# Patient Record
Sex: FEMALE | Race: WHITE
[De-identification: names, ages, dates, MRNs, and addresses within clinical notes are randomized per-mention and may not be internally consistent; named-entity substitution may affect disease eponyms.]

---

## 2020-12-14 ENCOUNTER — HOSPITAL ENCOUNTER (OUTPATIENT)
Dept: HOSPITAL 47 - EC | Age: 60
Setting detail: OBSERVATION
Discharge: HOME | End: 2020-12-14
Attending: HOSPITALIST | Admitting: HOSPITALIST
Payer: COMMERCIAL

## 2020-12-14 VITALS — HEART RATE: 66 BPM | RESPIRATION RATE: 16 BRPM

## 2020-12-14 VITALS — SYSTOLIC BLOOD PRESSURE: 147 MMHG | DIASTOLIC BLOOD PRESSURE: 75 MMHG

## 2020-12-14 VITALS — TEMPERATURE: 97.8 F

## 2020-12-14 DIAGNOSIS — Z88.1: ICD-10-CM

## 2020-12-14 DIAGNOSIS — F32.9: ICD-10-CM

## 2020-12-14 DIAGNOSIS — Z79.899: ICD-10-CM

## 2020-12-14 DIAGNOSIS — F41.9: ICD-10-CM

## 2020-12-14 DIAGNOSIS — R07.89: Primary | ICD-10-CM

## 2020-12-14 DIAGNOSIS — R63.0: ICD-10-CM

## 2020-12-14 DIAGNOSIS — I10: ICD-10-CM

## 2020-12-14 DIAGNOSIS — I16.0: ICD-10-CM

## 2020-12-14 DIAGNOSIS — Z82.49: ICD-10-CM

## 2020-12-14 DIAGNOSIS — Z91.012: ICD-10-CM

## 2020-12-14 DIAGNOSIS — M79.671: ICD-10-CM

## 2020-12-14 DIAGNOSIS — R53.83: ICD-10-CM

## 2020-12-14 DIAGNOSIS — Z88.5: ICD-10-CM

## 2020-12-14 DIAGNOSIS — R11.0: ICD-10-CM

## 2020-12-14 DIAGNOSIS — Z86.14: ICD-10-CM

## 2020-12-14 DIAGNOSIS — M79.672: ICD-10-CM

## 2020-12-14 DIAGNOSIS — F17.210: ICD-10-CM

## 2020-12-14 DIAGNOSIS — R00.2: ICD-10-CM

## 2020-12-14 DIAGNOSIS — R42: ICD-10-CM

## 2020-12-14 DIAGNOSIS — E78.5: ICD-10-CM

## 2020-12-14 DIAGNOSIS — R06.02: ICD-10-CM

## 2020-12-14 LAB
ALBUMIN SERPL-MCNC: 4.3 G/DL (ref 3.5–5)
ALP SERPL-CCNC: 90 U/L (ref 38–126)
ALT SERPL-CCNC: 13 U/L (ref 4–34)
ANION GAP SERPL CALC-SCNC: 7 MMOL/L
APTT BLD: 29.1 SEC (ref 22–30)
AST SERPL-CCNC: 20 U/L (ref 14–36)
BASOPHILS # BLD AUTO: 0.1 K/UL (ref 0–0.2)
BASOPHILS NFR BLD AUTO: 2 %
BUN SERPL-SCNC: 10 MG/DL (ref 7–17)
CALCIUM SPEC-MCNC: 9.5 MG/DL (ref 8.4–10.2)
CHLORIDE SERPL-SCNC: 106 MMOL/L (ref 98–107)
CHOLEST SERPL-MCNC: 229 MG/DL (ref ?–200)
CO2 SERPL-SCNC: 28 MMOL/L (ref 22–30)
EOSINOPHIL # BLD AUTO: 0.2 K/UL (ref 0–0.7)
EOSINOPHIL NFR BLD AUTO: 3 %
ERYTHROCYTE [DISTWIDTH] IN BLOOD BY AUTOMATED COUNT: 4.86 M/UL (ref 3.8–5.4)
ERYTHROCYTE [DISTWIDTH] IN BLOOD: 12.8 % (ref 11.5–15.5)
GLUCOSE SERPL-MCNC: 109 MG/DL (ref 74–99)
HCT VFR BLD AUTO: 43.7 % (ref 34–46)
HDLC SERPL-MCNC: 38 MG/DL (ref 40–60)
HGB BLD-MCNC: 15 GM/DL (ref 11.4–16)
INR PPP: 1 (ref ?–1.2)
LDLC SERPL CALC-MCNC: 135 MG/DL (ref 0–99)
LIPASE SERPL-CCNC: 104 U/L (ref 23–300)
LYMPHOCYTES # SPEC AUTO: 3.1 K/UL (ref 1–4.8)
LYMPHOCYTES NFR SPEC AUTO: 36 %
MAGNESIUM SPEC-SCNC: 2 MG/DL (ref 1.6–2.3)
MCH RBC QN AUTO: 30.9 PG (ref 25–35)
MCHC RBC AUTO-ENTMCNC: 34.4 G/DL (ref 31–37)
MCV RBC AUTO: 89.9 FL (ref 80–100)
MONOCYTES # BLD AUTO: 0.5 K/UL (ref 0–1)
MONOCYTES NFR BLD AUTO: 6 %
NEUTROPHILS # BLD AUTO: 4.5 K/UL (ref 1.3–7.7)
NEUTROPHILS NFR BLD AUTO: 53 %
PLATELET # BLD AUTO: 258 K/UL (ref 150–450)
POTASSIUM SERPL-SCNC: 3.9 MMOL/L (ref 3.5–5.1)
PROT SERPL-MCNC: 7.5 G/DL (ref 6.3–8.2)
PT BLD: 10 SEC (ref 9–12)
SODIUM SERPL-SCNC: 141 MMOL/L (ref 137–145)
TRIGL SERPL-MCNC: 281 MG/DL (ref ?–150)
WBC # BLD AUTO: 8.6 K/UL (ref 3.8–10.6)

## 2020-12-14 PROCEDURE — 84484 ASSAY OF TROPONIN QUANT: CPT

## 2020-12-14 PROCEDURE — 96374 THER/PROPH/DIAG INJ IV PUSH: CPT

## 2020-12-14 PROCEDURE — 85025 COMPLETE CBC W/AUTO DIFF WBC: CPT

## 2020-12-14 PROCEDURE — 80053 COMPREHEN METABOLIC PANEL: CPT

## 2020-12-14 PROCEDURE — 36415 COLL VENOUS BLD VENIPUNCTURE: CPT

## 2020-12-14 PROCEDURE — 85610 PROTHROMBIN TIME: CPT

## 2020-12-14 PROCEDURE — 99285 EMERGENCY DEPT VISIT HI MDM: CPT

## 2020-12-14 PROCEDURE — 93005 ELECTROCARDIOGRAM TRACING: CPT

## 2020-12-14 PROCEDURE — 83735 ASSAY OF MAGNESIUM: CPT

## 2020-12-14 PROCEDURE — 71046 X-RAY EXAM CHEST 2 VIEWS: CPT

## 2020-12-14 PROCEDURE — 83690 ASSAY OF LIPASE: CPT

## 2020-12-14 PROCEDURE — 80061 LIPID PANEL: CPT

## 2020-12-14 PROCEDURE — 96375 TX/PRO/DX INJ NEW DRUG ADDON: CPT

## 2020-12-14 PROCEDURE — 93454 CORONARY ARTERY ANGIO S&I: CPT

## 2020-12-14 PROCEDURE — 85730 THROMBOPLASTIN TIME PARTIAL: CPT

## 2020-12-14 PROCEDURE — 83880 ASSAY OF NATRIURETIC PEPTIDE: CPT

## 2020-12-14 PROCEDURE — 93306 TTE W/DOPPLER COMPLETE: CPT

## 2020-12-14 NOTE — XR
EXAM:

  XR Chest, 2 Views

 

CLINICAL HISTORY:

  Reason: Chest Pain

 

TECHNIQUE:

  Frontal and lateral views of the chest.

 

COMPARISON:

  No relevant prior studies available.

 

FINDINGS:

  Lungs:  Unremarkable.  Normal lung volumes.  No airspace consolidation. 

 No pulmonary edema.

  Pleural space:  Unremarkable.  No pneumothorax.  No pleural effusions.

  Heart:  Unremarkable.  No cardiomegaly.

  Mediastinum:  Unremarkable.  No mediastinal widening or shift.

  Bones/joints:  Unremarkable.

 

IMPRESSION:     

No evidence of acute cardiopulmonary abnormality.

## 2020-12-14 NOTE — P.DS
Providers


Date of admission: 


12/14/20 08:05





Expected date of discharge: 12/14/20


Attending physician: 


Eliezer Xavier





Consults: 





                                        





12/14/20 07:40


Consult Physician Urgent 


   Consulting Provider: Melo Bernal


   Consult Reason/Comments: chest pain


   Do you want consulting provider notified?: Yes











Primary care physician: 


Ubaldo [facility] Course: 





Chief Complaint: Chest pain





History of presenting complaint:


This is a pleasant 60-year-old patient of Dr. Scott.  Chronic stable medical 

conditions include hypertension, hyperlipidemia, nicotine dependence.  2 days 

ago patient started feeling oppressive sensation in the chest.  As his 70s 

taking her.  Present on and off.  Subsequently did radiate to her neck.  Patient

was slightly short of breath.  No perspiration.  No dizziness or 

lightheadedness.  Still negative stress test 5 years ago.  Patient admitted with

diagnosis of unstable angina.


Later today patient underwent a cardiac catheterization.  Found to have normal 

coronaries.  Troponins were negative.  2-D echocardiogram was unremarkable.





Consultation:


Dr. Maynard from cardiology





Physical examination:


VITAL SIGNS: 97.8, 16, 147.75, 97% room air


GENERAL: BMI 23.5, laying in bed, slightly anxious.


EYES: Pupils equal.  Conjunctiva normal.


NECK: JVD not raised; masses not palpable.


HEART: First and second heart sounds are normal;  no edema.  


LUNGS: Respiratory rate normal; slightly decreased breath sounds.  


ABDOMEN: Soft,  nontender, liver spleen not palpable, no masses palpable.  


PSYCH: [Alert and oriented x3;  mood  and affect anxious .  








INVESTIGATIONS, reviewed in the clinical context:


2-D echocardiogram-EF 50-55%


Cardiac catheterization-normal coronaries


White count 8.6 hemoglobin 15 platelets 258 potassium 3.9 creatinine 0.7 to


Troponin I 3 negative





EKG tracing personally reviewed by me-normal sinus rhythm


Chest x-ray film personally reviewed by me-normal sinus rhythm





Assessment:


-Anterior chest wall pain-possibly musculoskeletal


-Essential hypertension, with urgency


-Hyperlipidemia


-Chronic nicotine dependence patient cigarette smoker


-Mild anxiety





Disposition:


Home








Patient Condition at Discharge: Stable





Plan - Discharge Summary


Discharge Rx Participant: No


New Discharge Prescriptions: 


No Action


   Valsartan 80 mg PO DAILY


   Montelukast Sodium [Singulair] 10 mg PO DAILY


   Lisinopril [Zestril] 10 mg PO DAILY


   Cholecalciferol [Vitamin D3 (25 Mcg = 1000 Iu)] 1,000 unit PO DAILY


   Albuterol Inhaler [Ventolin Hfa Inhaler] 2 puff INHALATION RT-QID PRN


     PRN Reason: Shortness Of Breath


   Citalopram Hydrobromide [CeleXA] 20 mg PO DAILY


   Atorvastatin Calcium [Lipitor] 10 mg PO DAILY


Discharge Medication List





Albuterol Inhaler [Ventolin Hfa Inhaler] 2 puff INHALATION RT-QID PRN 12/14/20 

[History]


Atorvastatin Calcium [Lipitor] 10 mg PO DAILY 12/14/20 [History]


Cholecalciferol [Vitamin D3 (25 Mcg = 1000 Iu)] 1,000 unit PO DAILY 12/14/20 

[History]


Citalopram Hydrobromide [CeleXA] 20 mg PO DAILY 12/14/20 [History]


Lisinopril [Zestril] 10 mg PO DAILY 12/14/20 [History]


Montelukast Sodium [Singulair] 10 mg PO DAILY 12/14/20 [History]


Valsartan 80 mg PO DAILY 12/14/20 [History]








Follow up Appointment(s)/Referral(s): 


Ubaldo Scott DO [Primary Care Provider] - 1-2 days (Office is currently 

closed, please call Tuesday am to schedule appointment.)


Arie Maynard DO [STAFF PHYSICIAN] - 1 Week (Office is currently closed, 

please call Tuesday am to schedule appointment.)


Patient Instructions/Handouts:  *[facility] - After Heart Catheterization - 

Ambulatory Care Instructions


Discharge Disposition: HOME SELF-CARE

## 2020-12-14 NOTE — P.CARDCATH
Description of Procedure: 


PROCEDURES PERFORMED: Bilateral coronary angiography





INDICATION: Chest pain concerning for unstable angina





HISTORY: Patient is a pleasant 6-year-old female with history of hypertension, 

hyperlipidemia, tobacco abuse and palpitations who presents secondary to 

episodes of chest pain, nausea, shortness breath mainly lasting for 2-3 minutes 

oral past 2 days concerning for unstable angina.  Therefore patient was 

recommended to have heart catheterization performed.





CONSENT:I have discussed the risks, benefits and alternative therapies for the 

above-mentioned procedure and for both sedation/analgesia as well as necessary 

blood product administration, if indicated, as they pertain to this patient.  

The patient has indicated understanding and acceptance of the risks and 

procedures discussed.





PROCEDURE: After the risks, benefits and alternatives of the above mentioned 

procedure explained in detail with the patient, informed consent was obtained.  

Patient was taken to the catheterization lab and prepped and draped in usual 

fashion.  1% lidocaine was used to anesthetize the right radial artery.  A 6-

Danish sheath was placed in the right radial artery using modified Seldinger 

technique.  Left coronary angiography was performed with a 5-Danish JL 3.5 

catheter and right coronary angiography was performed with a 5-Danish JR5 

catheter in various views.  The right radial sheath was removed and a TR band 

was placed with hemostasis achieved.  The patient tolerated the procedure well. 

Patient was transported back to the post catheterization holding area in stable 

condition.





Conscious Sedation: Patient was monitored under the direct supervision of vision

of myself for conscious sedation using Versed and fentanyl for a total duration 

of 11 minutes   


HEMODYNAMICS: 


Ao: 147/77





SELECTIVE CORONARY ARTERIOGRAPHY: 


LEFT MAIN: The left main is a large caliber vessel which bifurcates into the LAD

and circumflex.  There is no significant stenosis.


LEFT ANTERIOR DESCENDING CORONARY ARTERY: LAD is a large caliber vessel which 

wraps around to the apex.  There is no significant stenosis.


LEFT CIRCUMFLEX CORONARY ARTERY: Left circumflex is a moderate caliber vessel 

without significant stenosis.


RIGHT CORONARY ARTERY: The right coronary artery is a large caliber vessel which

gives off a PDA and PLV branch and is the dominant vessel.  There is no 

significant stenosis.








FINAL IMPRESSION: 


1.  Normal coronary arteries as described above.


2.  Normal ejection fraction 60% percent without wall motion abnormalities.





PLAN: 


1.  Aggressive risk factor modification per most recent ACC/AHA guidelines.


2.  Follow-up in the office in 1-2 weeks.

## 2020-12-14 NOTE — ED
Chest Pain HPI





- General


Chief Complaint: Chest Pain


Stated Complaint: chest pain


Time Seen by Provider: 20 06:11


Source: patient, RN notes reviewed


Mode of arrival: wheelchair


Limitations: no limitations





- History of Present Illness


Initial Comments: 





This a 60-year-old female presents emergency Department with chief complaint of 

chest pain.  Patient states it started on Saturday does wax and wane she states 

is currently is a dull pain.  Patient has no significant cardiac history though 

she does have a history of hypertension hyperlipidemia and is a smoker.  Patient

does have some family heart disease.  Patient states that she has no increased 

shortness of breath no abdominal pain no low back pain upper back pain.





- Related Data


                                    Allergies











Allergy/AdvReac Type Severity Reaction Status Date / Time


 


azithromycin Allergy  Unknown Verified 20 06:16


 


egg Allergy  Unknown Verified 20 06:16


 


tramadol [From Ultram] Allergy  Unknown Verified 20 06:16














Review of Systems


ROS Statement: 


Those systems with pertinent positive or pertinent negative responses have been 

documented in the HPI.





ROS Other: All systems not noted in ROS Statement are negative.





EKG Findings





- EKG Comments:


EKG Findings:: EKG performed at 6:13 normal sinus rhythm rate of 82  QRS 

86 QT//462





Past Medical History


Past Medical History: Hyperlipidemia, Hypertension


History of Any Multi-Drug Resistant Organisms: MRSA


Date of last positivie culture/infection: 


MDRO Source:: sinus


Past Surgical History:  Section, Tubal Ligation


Additional Past Surgical History / Comment(s): sinus


Past Psychological History: Depression


Smoking Status: Current every day smoker


Past Alcohol Use History: None Reported


Past Drug Use History: None Reported





General Exam


Limitations: no limitations


General appearance: alert, in no apparent distress


Head exam: Present: atraumatic, normocephalic, normal inspection


Eye exam: Present: normal appearance, PERRL, EOMI.  Absent: scleral icterus, 

conjunctival injection, periorbital swelling


ENT exam: Present: normal exam, normal oropharynx, mucous membranes moist


Neck exam: Present: normal inspection, full ROM.  Absent: tenderness, 

meningismus, lymphadenopathy


Respiratory exam: Present: normal lung sounds bilaterally.  Absent: respiratory 

distress, wheezes, rales, rhonchi, stridor


Cardiovascular Exam: Present: regular rate, normal rhythm, normal heart sounds. 

Absent: systolic murmur, diastolic murmur, rubs, gallop, clicks


GI/Abdominal exam: Present: soft, normal bowel sounds.  Absent: distended, 

tenderness, guarding, rebound, rigid


Neurological exam: Present: alert, oriented X3, CN II-XII intact, reflexes 

normal.  Absent: motor sensory deficit


Skin exam: Present: warm, dry, intact, normal color.  Absent: rash





Course


                                   Vital Signs











  20





  06:16 06:45 07:07


 


Temperature 98.5 F  


 


Pulse Rate 87 60 69


 


Respiratory 16 16 





Rate   


 


Blood Pressure 200/103 160/81 168/83


 


O2 Sat by Pulse 99 98 97





Oximetry   














  20





  07:18


 


Temperature 


 


Pulse Rate 68


 


Respiratory 18





Rate 


 


Blood Pressure 162/91


 


O2 Sat by Pulse 98





Oximetry 














Chest Pain MDM





- MDM





EKG, chest x-ray, labs are unremarkable at this point patient still has some 

mild discomfort.  Patient will be admitted for cardiac rule out.





Disposition


Clinical Impression: 


 Chest pain





Disposition: ADMITTED AS IP TO THIS HOSP


Condition: Fair


Referrals: 


Ubaldo Scott DO [Primary Care Provider] - 1-2 days

## 2020-12-14 NOTE — P.HPIM
History of Present Illness


H&P Date: 20


Chief Complaint: Chest pain





History of presenting complaint:


This is a pleasant 60-year-old patient of Dr. Scott.  Chronic stable medical 

conditions include hypertension, hyperlipidemia, nicotine dependence.  2 days 

ago patient started feeling oppressive sensation in the chest.  As his 70s 

taking her.  Present on and off.  Subsequently did radiate to her neck.  Patient

was slightly short of breath.  No perspiration.  No dizziness or 

lightheadedness.  Still negative stress test 5 years ago.  Patient admitted with

diagnosis of unstable angina.





Review of systems:


GEN.: None


EYES: None


HEENT: None


NECK: None


RESPIRATORY: As above


CARDIOVASCULAR: As above


GASTROINTESTINAL: None


GENITOURINARY: None


MUSCULOSKELETAL: None


LYMPHATICS: None


HEMATOLOGICAL: None  


PSYCHIATRY: None


NEUROLOGICAL: None





Past medical history to include:


Hypertension, hyperlipidemia, MRSA infection, depression





Social history:


Patient smokes a pack a day.  Patient's with the registered nurse at [employer] .  Lives with her daughter and son-in-law.





Family history:


Reviewed, noncontributory to presentation





Physical examination:


VITAL SIGNS: 98.5, 87, 16, 1 61/81, 99% room air


GENERAL: BMI 23.5, laying in bed, slightly anxious.


EYES: Pupils equal.  Conjunctiva normal.


HEENT: External appearance of nose and ears normal, oral cavity grossly normal.


NECK: JVD not raised; masses not palpable.


HEART: First and second heart sounds are normal;  no edema.  


LUNGS: Respiratory rate normal; slightly decreased breath sounds.  


ABDOMEN: Soft,  nontender, liver spleen not palpable, no masses palpable.  


PSYCH: [Alert and oriented x3;  mood  and affect anxious l.  


NEUROLOGICAL: Cranial nerves grossly intact; no facial asymmetry,   power and 

sensation grossly intact. 


LYMPHATICS: No lymph nodes palpable in the axilla and neck





INVESTIGATIONS, reviewed in the clinical context:


White count 8.6 hemoglobin 15 platelets 258 potassium 3.9 creatinine 0.7 to


Troponin I 3 negative





EKG tracing personally reviewed by me-normal sinus rhythm


Chest x-ray film personally reviewed by me-normal sinus rhythm





Assessment:


-Possible unstable angina in a patient with cardiac risk factors including 

hypertension, hyperlipidemia, nicotine's smoker


-Essential hypertension, with urgency


-Hyperlipidemia


-Chronic nicotine dependence patient cigarette smoker


-Mild anxiety





Plan:


Patient had been put on IV heparin.  MI was ruled out.  Seen by cardiology 

earlier today.  Plan is for a cardiac catheterization.  2-D echocardiogram was 

ordered.  Patient advised against smoking.











Past Medical History


Past Medical History: Hyperlipidemia, Hypertension


History of Any Multi-Drug Resistant Organisms: MRSA


Date of last positivie culture/infection: 


MDRO Source:: sinus


Past Surgical History:  Section, Tubal Ligation


Additional Past Surgical History / Comment(s): sinus


Past Psychological History: Depression


Smoking Status: Current every day smoker


Past Alcohol Use History: None Reported


Past Drug Use History: None Reported





Medications and Allergies


                                Home Medications











 Medication  Instructions  Recorded  Confirmed  Type


 


Albuterol Inhaler [Ventolin Hfa 2 puff INHALATION RT-QID PRN 20 

History





Inhaler]    


 


Atorvastatin Calcium [Lipitor] 10 mg PO DAILY 20 History


 


Cholecalciferol [Vitamin D3 (25 1,000 unit PO DAILY 20 History





Mcg = 1000 Iu)]    


 


Citalopram Hydrobromide [CeleXA] 20 mg PO DAILY 20 History


 


Lisinopril [Zestril] 10 mg PO DAILY 20 History


 


Montelukast Sodium [Singulair] 10 mg PO DAILY 20 History


 


Valsartan 80 mg PO DAILY 20 History








                                    Allergies











Allergy/AdvReac Type Severity Reaction Status Date / Time


 


azithromycin Allergy  Unknown Verified 20 06:16


 


egg Allergy  Unknown Verified 20 06:16


 


tramadol [From Ultram] Allergy  Unknown Verified 20 06:16














Physical Exam


Vitals: 


                                   Vital Signs











  Temp Pulse Pulse Resp BP BP Pulse Ox


 


 20 09:22  98.3 F   66  16   186/79  98


 


 20 08:50  98.4 F  65   18  165/81   99


 


 20 07:18   68   18  162/91   98


 


 20 07:07   69    168/83   97


 


 20 06:45   60   16  160/81   98


 


 20 06:16  98.5 F  87   16  200/103   99








                                Intake and Output











 20





 22:59 06:59 14:59


 


Other:   


 


  Weight  68.039 kg 68.039 kg














Results


CBC & Chem 7: 


                                 20 06:27





                                 20 06:27


Labs: 


                  Abnormal Lab Results - Last 24 Hours (Table)











  20 Range/Units





  06:27 06:27 


 


Glucose  109 H   (74-99)  mg/dL


 


Triglycerides   281 H  (<150)  mg/dL


 


Cholesterol   229 H  (<200)  mg/dL


 


LDL Cholesterol, Calc   135 H  (0-99)  mg/dL


 


HDL Cholesterol   38 L  (40-60)  mg/dL














Thrombosis Risk Factor Assmnt





- Choose All That Apply


Any of the Below Risk Factors Present?: Yes


Each Factor Represents 1 point: Age 41-60 years


Other Risk Factors: No


Other congenital or acquired thrombophilia - If yes, enter type in comment: No


Thrombosis Risk Factor Assessment Total Risk Factor Score: 1


Thrombosis Risk Factor Assessment Level: Low Risk

## 2020-12-14 NOTE — P.CRDCN
History of Present Illness


History of present illness: 





HISTORY OF PRESENTING ILLNESS


This is a pleasant 60-year-old  female past medical history significant

for hypertension, dyslipidemia and chronic nicotine dependence. She denies prior

history of coronary artery disease and does not follow with a cardiologist for 

any reason. She states 6-years ago she saw one for palpitations. At that time 

she had a stress test and wore an outpatient monitor. According to her both 

tests were negative. We have been asked to see in consultation for chest pain. 

She states Saturday while at work she felt an heavy pressure in her chest like 

someone kicked her. This was associated with shortness of breath. She was at 

work at the time and was able to continue about her day and finish. She left 

work around 0200 and went to sleep. She slept for 8 hours. Woke up and still 

didn't feel well so she went back to sleep and slept another 8 hours. She went 

to work again last night and had another episode of chest pain that this time 

radiated to her left neck and jaw. Again associated with shortness of breath and

dizziness. She also describes recently having loss of appetite with mild nausea 

and increased fatigue. She denies palpitations, diaphoresis or vomiting. 

Currently she still has some mild chest pressure 2-3/10. She states she has been

working with her PCP over the last few months for persistently elevated blood 

pressure. She had been on lisinopril and it was causing her a dry cough. 3 

months ago he added valsartan on top of the lisinopril. She states she has been 

taking both and her blood pressures are not improving. On arrival her blood 

pressure was 200/103. She was given IV enalapril, repeat pressure 162/91. She is

also concerned that for the previous 1-yr when she lays down to sleep at night 

her feet throb and feel cold. Strong pulses palpated distally. 





DIAGNOSTICS


EKG reveals sinus mechanism with non-specific mild ST abnormalities noted, no 

old EKG for review.


Telemetry tracings indicate sinus mechanism, no arrhythmias noted.


Chest xray negative for an acute cardiopulmonary process.


Laboratory reviewed, CBC unremarkable, sodium 141, potassium 3.9, creatinine 

0.72, troponin negative x1 and proBNP 193.


Current cardiac medications include valsartan 80 mg daily, lisinopril 10 mg 

daily and atorvastatin 10 mg daily. 





REVIEW OF SYSTEMS


At the time of my exam:


CONSTITUTIONAL: Denies fever or chills.


CARDIOVASCULAR: Denies chest pain, shortness of breath, orthopnea, PND or 

palpitations.


RESPIRATORY: Denies cough. 


GASTROINTESTINAL: Denies abdominal pain, diarrhea, constipation, nausea or 

vomiting.


MUSCULOSKELETAL: Denies myalgias.


NEUROLOGIC: Denies numbness, tingling or weakness.


ENDOCRINE: Denies fatigue, weight change,  polydipsia or polyurina.


GENITOURINARY: Denies burning, hematuria or urgency with micturation.


HEMATOLOGIC: Denies history of anemia or bleeding. 





PHYSICAL EXAMINATION


Blood pressure 165/81 heart rate 65 afebrile and maintaining oxygen saturation 

on room air.


CONSTITUTIONAL: No apparent distress. 


HEENT: Head is normocephalic. Pupils are equal, round. Sclerae anicteric. Mucous

membranes of the mouth are moist.  No JVD. No carotid bruit.


CHEST EXAMINATION: Lungs are clear to auscultation. No chest wall tenderness is 

noted on palpation or with deep breathing. 


HEART EXAMINATION: Regular rate and rhythm. S1, S2 heard. No murmurs, gallops or

rub.


ABDOMEN: Soft, nontender. Positive bowel sounds.


EXTREMITIES: 2+ peripheral pulses, no lower extremity edema and no calf 

tenderness.


NEUROLOGIC EXAMINATION: Patient is awake, alert and oriented x3. 





ASSESSMENT


Chest pain


Hypertension, uncontrolled


Dyslipidemia


Foot pain at night


Chronic nicotine dependence





PLAN


Symptoms are concerning for underlying coronary artery disease. We recommend 

proceeding with cardiac catheterization. I have discussed the risks, benefits 

and alternative therapies for the above-mentioned procedure and for both 

sedation/analgesia as well as necessary blood product administration, if 

indicated, as they pertain to this patient.  The patient has indicated 

understanding and acceptance of the risks and procedures discussed. Questions 

have been answered appropriately and she is agreeable to move forward with above

stated procedure. If unremarkable, consider outpatient monitor. 


Home regimen of valsartan and lisinopril is not ideal or recommended as they are

from the same family of drugs. Given her side effects with lisinopril we will 

recommend to discontinue that mediation and increase the daily dose of valsartan

to 160 mg. 


Check lipid panel. 


Obtain 2D echocardiogram and doppler study to assess cardiac structure and 

function. 


Smoking cessation recommended.


Further recommendations to follow based on clinical course. 


Thank you kindly for this consultation. 





Nurse Practitioner note has been reviewed, I agree with a documented findings 

and plan of care.  Patient was seen and examined.








Past Medical History


Past Medical History: Hyperlipidemia, Hypertension


History of Any Multi-Drug Resistant Organisms: MRSA


Date of last positivie culture/infection: 


MDRO Source:: sinus


Past Surgical History:  Section, Tubal Ligation


Additional Past Surgical History / Comment(s): sinus


Past Psychological History: Depression


Smoking Status: Current every day smoker


Past Alcohol Use History: None Reported


Past Drug Use History: None Reported





Medications and Allergies


                                Home Medications











 Medication  Instructions  Recorded  Confirmed  Type


 


Albuterol Inhaler [Ventolin Hfa 2 puff INHALATION RT-QID PRN 20 

History





Inhaler]    


 


Atorvastatin Calcium [Lipitor] 10 mg PO DAILY 20 History


 


Cholecalciferol [Vitamin D3 (25 1,000 unit PO DAILY 20 History





Mcg = 1000 Iu)]    


 


Citalopram Hydrobromide [CeleXA] 20 mg PO DAILY 20 History


 


Lisinopril [Zestril] 10 mg PO DAILY 20 History


 


Montelukast Sodium [Singulair] 10 mg PO DAILY 20 History


 


Valsartan 80 mg PO DAILY 20 History








                                    Allergies











Allergy/AdvReac Type Severity Reaction Status Date / Time


 


azithromycin Allergy  Unknown Verified 20 06:16


 


egg Allergy  Unknown Verified 20 06:16


 


tramadol [From Ultram] Allergy  Unknown Verified 20 06:16














Physical Exam


Vitals: 


                                   Vital Signs











  Temp Pulse Resp BP Pulse Ox


 


 20 08:50  98.4 F  65  18  165/81  99


 


 20 07:18   68  18  162/91  98


 


 20 07:07   69   168/83  97


 


 20 06:45   60  16  160/81  98


 


 20 06:16  98.5 F  87  16  200/103  99








                                Intake and Output











 20





 22:59 06:59 14:59


 


Other:   


 


  Weight  68.039 kg 














Results





                                 20 06:27





                                 20 06:27


                                 Cardiac Enzymes











  20 Range/Units





  06:27 06:27 


 


AST  20   (14-36)  U/L


 


Troponin I   <0.012  (0.000-0.034)  ng/mL








                                   Coagulation











  20 Range/Units





  06:27 


 


PT  10.0  (9.0-12.0)  sec


 


APTT  29.1  (22.0-30.0)  sec








                                       CBC











  20 Range/Units





  06:27 


 


WBC  8.6  (3.8-10.6)  k/uL


 


RBC  4.86  (3.80-5.40)  m/uL


 


Hgb  15.0  (11.4-16.0)  gm/dL


 


Hct  43.7  (34.0-46.0)  %


 


Plt Count  258  (150-450)  k/uL








                          Comprehensive Metabolic Panel











  20 Range/Units





  06:27 


 


Sodium  141  (137-145)  mmol/L


 


Potassium  3.9  (3.5-5.1)  mmol/L


 


Chloride  106  ()  mmol/L


 


Carbon Dioxide  28  (22-30)  mmol/L


 


BUN  10  (7-17)  mg/dL


 


Creatinine  0.72  (0.52-1.04)  mg/dL


 


Glucose  109 H  (74-99)  mg/dL


 


Calcium  9.5  (8.4-10.2)  mg/dL


 


AST  20  (14-36)  U/L


 


ALT  13  (4-34)  U/L


 


Alkaline Phosphatase  90  ()  U/L


 


Total Protein  7.5  (6.3-8.2)  g/dL


 


Albumin  4.3  (3.5-5.0)  g/dL








                               Current Medications











Generic Name Dose Route Start Last Admin





  Trade Name Eronq  PRN Reason Stop Dose Admin


 


Aspirin  81 mg  12/15/20 09:00 





  Aspirin 81 Mg  PO  





  DAILY [facility]  


 


Heparin Sodium (Porcine)  0 unit  20 07:39 





  Heparin Sodium,Porcine 5,000 Unit/Ml 1 Ml Vial  IV  





  Q6HR PRN  





  Low PTT  





  Protocol  


 


Heparin Sodium/Sodium Chloride  250 mls @ 8.165 mls/hr  20 07:45  20

08:44





  25,000 unit/ Sodium Chloride  IV   12 units/kg/hr





  .Q24H ADELAIDA   8.165 mls/hr





    Administration





  Protocol  





  12 UNITS/KG/HR  


 


Nitroglycerin  0.4 mg  20 07:39 





  Nitroglycerin Sl Tabs 0.4 Mg Tab  SUBLINGUAL  





  Q5M PRN  





  Chest Pain  


 


Valsartan  160 mg  20 09:30 





  Valsartan 160 Mg Tab  PO  





  DAILY DAELAIDA  








                                Intake and Output











 20





 22:59 06:59 14:59


 


Other:   


 


  Weight  68.039 kg 








                                        





                                 20 06:27 





                                 20 06:27

## 2020-12-14 NOTE — ECHOF
Referral Reason:cp, htn



MEASUREMENTS

--------

HEIGHT: 170.2 cm

WEIGHT: 68.0 kg

BP: 

RVIDd:   2.4 cm     (< 3.3)

IVSd:   1.1 cm     (0.6 - 1.1)

LVIDd:   4.0 cm     (3.9 - 5.3)

LVPWd:   1.1 cm     (0.6 - 1.1)

IVSs:   1.4 cm

LVIDs:   3.4 cm

LVPWs:   1.3 cm

LAESV Index (A-L):   16.94 ml/m

Ao Diam:   3.0 cm     (2.0 - 3.7)

AV Cusp:   1.7 cm     (1.5 - 2.6)

MV EXCURSION:   17.007 mm     (> 18.000)

MV EF SLOPE:   86 mm/s     (70 - 150)

EPSS:   0.7 cm

MV E Jose Alberto:   0.44 m/s

MV DecT:   323 ms

MV A Jose Alberto:   0.68 m/s

MV E/A Ratio:   0.65 

RAP:   5.00 mmHg

RVSP:   17.11 mmHg







FINDINGS

--------

Sinus rhythm.

This was a technically good study.

The left ventricular size is normal.   There is borderline concentric left ventricular hypertrophy.  
 Overall left ventricular systolic function is low-normal with, an EF between 50 - 55 %.

The right ventricle is normal in size.

Normal LA  size by volume 22+/-6 ml/m2.

The right atrial size is normal.

The aortic valve is trileaflet, and appears structurally normal. No aortic stenosis or regurgitation.


Mild mitral regurgitation is present.

Mild tricuspid regurgitation present.   Right ventricular systolic pressure is normal at < 35 mmHg.

There is no pulmonic regurgitation present.

The aortic root size is normal.

There is no pericardial effusion.



CONCLUSIONS

--------

1. The left ventricular size is normal.

2. There is borderline concentric left ventricular hypertrophy.

3. Overall left ventricular systolic function is low-normal with, an EF between 50 - 55 %.

4. The right ventricle is normal in size.

5. Normal LA size by volume 22+/-6 ml/m2.

6. The right atrial size is normal.

7. Mild mitral regurgitation is present.

8. Mild tricuspid regurgitation present.

9. There is no pulmonic regurgitation present.

10. The aortic root size is normal.

11. There is no pericardial effusion.





SONOGRAPHER: Farnaz Coats RDCS

## 2021-03-16 ENCOUNTER — HOSPITAL ENCOUNTER (OUTPATIENT)
Dept: HOSPITAL 47 - SLEEP | Age: 61
End: 2021-03-16
Attending: INTERNAL MEDICINE
Payer: COMMERCIAL

## 2021-03-16 DIAGNOSIS — F17.210: ICD-10-CM

## 2021-03-16 DIAGNOSIS — F32.9: ICD-10-CM

## 2021-03-16 DIAGNOSIS — G47.10: Primary | ICD-10-CM

## 2021-03-16 DIAGNOSIS — I10: ICD-10-CM

## 2021-03-16 DIAGNOSIS — E78.5: ICD-10-CM

## 2021-03-16 DIAGNOSIS — F41.9: ICD-10-CM

## 2021-03-16 DIAGNOSIS — Z79.899: ICD-10-CM

## 2021-03-16 DIAGNOSIS — G47.00: ICD-10-CM

## 2021-03-16 PROCEDURE — 99211 OFF/OP EST MAY X REQ PHY/QHP: CPT

## 2021-03-16 NOTE — CONS
CONSULTATION



REASON FOR CONSULTATION:

Evaluation of sleep apnea.



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

shift.  The patient works between 9 p.m. and 7 a.m. in the morning.  She is known to

have hypertension and she also has a history of chronic anxiety and depression.  She

takes citalopram 20 mg p.o. daily.  The patient has a special home situation where she

has her daughter and son-in-law in addition to 3 other kids living with her in the same

house.  One of her grandchildren has a psychiatric disorder, schizoaffective disorder,

and he can be at times aggressive and agitated.  In any rate, she is having

difficulties with her concentration span and attention span and her functionality at

work, and she thinks it is related to her sleep.  Typically she gets home by 7 and she

goes to bed around 9 a.m. She has a very comfortable bedroom which is quite dark and

comfortable, and she typically likes to get around 6-7 hours of sleep but she is unable

to do so, as the patient wakes up around 1 p.m. and she is unable to go back to sleep.

She struggles back and forth, and at times she gets up, does some work, and goes back

to bed and manages to get another hour or two of sleep between 1 p.m. and 7 p.m. As

such, she is feeling tired and she is feeling sleepy during her working hours.  No

recent weight gain.  No documented history of snoring or witnessed apneas, although she

thinks that she snores.  No significant restlessness in the lower extremities.  No

issues with pain.  No sleepwalking.  No sleeptalking.  No grinding of the teeth.  No

nocturia.  No episodes of waking up choking or gasping for air.  As such, the exact

cause for her inability to maintain sleep beyond 1 p.m. is not clear to her.



PAST MEDICAL HISTORY:

Hypertension, chronic anxiety/depression and history of chronic smoking.



SURGICAL HISTORY:

Tubal ligation.



DRUG ALLERGIES:

NOT KNOWN. She is ALLERGIC to TOMATOES, EGGS, COFFEE, CATS, DOGS AND LENTZ'S YEAST.



MEDICATION:

Medication includes:

1. Chlorthalidone 1 tablet a day.

2. Citalopram 20 mg p.o. daily.

3. Lipitor 20 mg p.o. daily.

4. Valsartan 80 mg p.o. daily.

5. Singulair 10 mg p.o. daily.

6. Vitamin D 2000 units daily.



SOCIAL HISTORY:

She smokes one pack of cigarettes a day.  No history of alcoholism.  No history of IV

drugs.



FAMILY HISTORY:

Noncontributory.  Negative for sleep apnea.



REVIEW OF SYSTEMS:

Fourteen-point review of systems was done.  Positive findings are all mentioned above

in the history of present illness.  There may be a component of anxiety and depression.

The patient tries to sleep on her side.  She does not drink coffee.  She drinks

sometimes Mountain Dew.  She typically has a light breakfast.  No sleep paralysis.  No

hallucinations or cataplexy.  She is concerned that she may be falling asleep while

driving back and forth to work.  No chest pain.  No nausea. No vomiting.  No heartburn.

No respiratory difficulties during sleep.  No nightmares.  No head trauma.  No

meningitis.  No stroke.



PHYSICAL EXAMINATION:

VITAL SIGNS: BP is 160/94, pulse 84, respirations 12, temperature 98.8, saturation 97%

on room air.  Height is 5 feet 7 inches, weight is 155.  [place] score is 14.  Neck

size is 14-1/2 and BMI is 24.2.

GENERAL APPEARANCE:  Calm, comfortable.

HEAD: Atraumatic, normocephalic.

NECK:  Supple.  No JVD.  No goiter or neck masses. Mallampati class II to III.

LUNGS:  Clear to auscultation.

HEART:  Heart sounds are regular rate and rhythm.  Normal S1, S2.  No S3, S4.  No

murmurs.

ABDOMEN:  Soft, nontender.  No organomegaly.

EXTREMITIES:  No edema.  No cyanosis or clubbing.



IMPRESSION:

1. Hypersomnia during working hours.  The patient is a night shift worker and she is

    unable to generate an adequate number of hours of sleep in the morning, and we

    think this is probably contributing to her diminished level of alertness and

    increased sleepiness at her working hours.  Fortunately the patient has not had any

    judgment error or mistakes at work, and she manages to keep her job as a nurse at

    the [employer].

2. Sleep maintenance insomnia.  Consider underlying obstructive sleep apnea, although

    this is a low likelihood, in my opinion.  Other factors could be contributing,

    including increased anxiety and depression in addition to other social factors at

    home which may be potentially waking up the patient in the early afternoon time and

    she is unable to maintain a good 6 to 7 hours of sleep.

3. Chronic anxiety and depression, on Celexa.

4. Hypertension.

5. Hyperlipidemia.

6. History of smoking.



PLAN:

1. Encourage to optimize sleep hygiene measures as much as possible.

2. Will ask the patient to wear sunglasses on the way driving back home and avoid

    light stimulation.

3. Will ask the patient to sleep in a very comfortable bedroom environment without any

    distraction or noise from family members or any other elements in the house,

    including pets.

4. Avoid caffeinated beverages or any other form of stimulants at least 3-4 hours

    prior to her time to go to bed.

5. Will do a polysomnogram. This will be a morning polysomnogram to evaluate this

    patient for any other abnormalities contributing to her difficulties in maintaining

    sleep.

6. I doubt the patient has obstructive sleep apnea.  I doubt the patient has night

    shift worker's disease/disorder.  It is possible that extending her sleep hours may

    help her with her functionality during the day.  A final decision will be made

    after reviewing the polysomnogram.  May add a morning hypnotic to extend her sleep

    hours to an average of 6 to 7 hours.  The patient has tried melatonin and Benadryl

    in the past, without much success.

7. Keep the same medication.

8. Will continue to follow.





KEITH / HILLN: [id number] / Job#: [id number]

## 2022-06-22 ENCOUNTER — HOSPITAL ENCOUNTER (EMERGENCY)
Dept: HOSPITAL 47 - EC | Age: 62
Discharge: HOME | End: 2022-06-22
Payer: COMMERCIAL

## 2022-06-22 VITALS — RESPIRATION RATE: 16 BRPM | DIASTOLIC BLOOD PRESSURE: 69 MMHG | HEART RATE: 80 BPM | SYSTOLIC BLOOD PRESSURE: 122 MMHG

## 2022-06-22 VITALS — TEMPERATURE: 98.4 F

## 2022-06-22 DIAGNOSIS — Z79.899: ICD-10-CM

## 2022-06-22 DIAGNOSIS — R51.9: Primary | ICD-10-CM

## 2022-06-22 DIAGNOSIS — F17.210: ICD-10-CM

## 2022-06-22 DIAGNOSIS — E78.5: ICD-10-CM

## 2022-06-22 DIAGNOSIS — F32.A: ICD-10-CM

## 2022-06-22 DIAGNOSIS — I10: ICD-10-CM

## 2022-06-22 PROCEDURE — 99284 EMERGENCY DEPT VISIT MOD MDM: CPT

## 2022-06-22 PROCEDURE — 70486 CT MAXILLOFACIAL W/O DYE: CPT

## 2022-06-22 NOTE — CT
EXAMINATION TYPE: CT sinus wo con

 

DATE OF EXAM: 6/22/2022

 

COMPARISON: CT dated 1/26/2016

 

HISTORY: Vertigo and left sided facial pain

 

CT DLP: 408.7 mGycm.  Automated Exposure Control for Dose Reduction was Utilized.

 

TECHNIQUE: CT scan of the sinuses is performed without contrast, axial images are obtained, coronal r
eformatted images are also reviewed.

 

FINDINGS:

Interval endoscopic sinus surgery with bilateral maxillary antrostomy, uncinectomy, partial middle tu
rbinectomy and partial ethmoidectomy. Mild mucosal thickening of the nasal fossa bilaterally. Patent 
bilateral maxillary antrostomies. No significant mucosal thickening of the maxillary sinuses, frontal
 sinus, sphenoid sinus and residual ethmoid air cells.

 

Sclerotic changes of the superior posterior aspect of the ethmoid air cells, possibly related to 
tristin inflammatory changes. Patent sphenoethmoidal recesses. Minimal opacification and mild sclerotic c
hanges of the inferior aspects of the mastoid air cells.

 

Grossly unremarkable middle ears cavities. Symmetrical unremarkable TMJs. Mild soft tissue thickening
 of the left side of the nasopharynx, underlying lesion can't be excluded, please correlate clinicall
y. Unremarkable visualized portion of the brain and orbits.

 

IMPRESSION:

No significant mucosal thickening in the paranasal sinuses. Postoperative changes and other findings 
as detailed above.